# Patient Record
Sex: MALE | Race: WHITE | NOT HISPANIC OR LATINO
[De-identification: names, ages, dates, MRNs, and addresses within clinical notes are randomized per-mention and may not be internally consistent; named-entity substitution may affect disease eponyms.]

---
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
## 2021-11-06 NOTE — ASSESSMENT
[facility] (Discharge Huddle; Vulnerable Patient) [FreeTextEntry1] : The patient's symptoms are quite consistent with underlying irritable bowel syndrome with alternating diarrhea and constipation type which has been associated with bloating. I have recommended that he obtain a repeat CBC, complete metabolic profile, thyroid function test, celiac antibodies and a full set of stool studies. He will also repeat CAT scan enterography. I will obtain the results of his last upper endoscopy and colonoscopy for my review. In the meantime she will start Benefiber one heaping tablespoon in 6-8 ounces of fluid to be taken daily as well as a 2 week trial of Xifaxan 550 mg p.o. t.i.d. for his underlying bloating and IBS with diarrhea. He'll be seen in 6 weeks for further followup.

## 2023-04-12 ENCOUNTER — NON-APPOINTMENT (OUTPATIENT)
Age: 23
End: 2023-04-12